# Patient Record
(demographics unavailable — no encounter records)

---

## 2024-11-07 NOTE — HISTORY OF PRESENT ILLNESS
[de-identified] : 67 years old female has past medical history of prediabetes, hyperlipidemia, overactive bladder, osteoporosis, vitamin D and B 12 deficiency, came back to review her most recent test results. Fasting glucose was 94, HBA1c 5.8, total cholesterol 188, , TSH 4.22, .8. Reports were reviewed with pt in details. Other blood tests came back wnl.

## 2024-11-07 NOTE — HISTORY OF PRESENT ILLNESS
[de-identified] : 67 years old female has past medical history of prediabetes, hyperlipidemia, overactive bladder, osteoporosis, vitamin D and B 12 deficiency, came back to review her most recent test results. Fasting glucose was 94, HBA1c 5.8, total cholesterol 188, , TSH 4.22, .8. Reports were reviewed with pt in details. Other blood tests came back wnl.

## 2025-02-19 NOTE — HISTORY OF PRESENT ILLNESS
[de-identified] : 67 years old female has past medical history of prediabetes, hyperlipidemia, overactive bladder, osteoporosis, vitamin D and B 12 deficiency, came back to review her most recent test results. Fasting glucose was 90, HBA1c 5.8, total cholesterol 173, . Reports were reviewed with pt in details. Other blood tests came back wnl.

## 2025-02-19 NOTE — HISTORY OF PRESENT ILLNESS
[de-identified] : 67 years old female has past medical history of prediabetes, hyperlipidemia, overactive bladder, osteoporosis, vitamin D and B 12 deficiency, came back to review her most recent test results. Fasting glucose was 90, HBA1c 5.8, total cholesterol 173, . Reports were reviewed with pt in details. Other blood tests came back wnl.

## 2025-02-19 NOTE — REVIEW OF SYSTEMS
[Negative] : Gastrointestinal [Joint Pain] : no joint pain [Joint Stiffness] : no joint stiffness [Joint Swelling] : no joint swelling [Muscle Pain] : muscle pain [Back Pain] : no back pain

## 2025-05-21 NOTE — END OF VISIT
[FreeTextEntry3] : Pt brought vaccine record to office. Update all immunizations done in pharmacy. Pt was advised to get Tdap vaccine in her pharmacy too.

## 2025-05-21 NOTE — HEALTH RISK ASSESSMENT
[Patient reported mammogram was normal] : Patient reported mammogram was normal [Patient reported colonoscopy was normal] : Patient reported colonoscopy was normal [Good] : ~his/her~  mood as  good [One fall no injury in past year] : Patient reported one fall in the past year without injury [0] : 2) Feeling down, depressed, or hopeless: Not at all (0) [PHQ-2 Negative - No further assessment needed] : PHQ-2 Negative - No further assessment needed [No] : does not take [Never] : Never [With Family] : lives with family [] :  [Fully functional (bathing, dressing, toileting, transferring, walking, feeding)] : Fully functional (bathing, dressing, toileting, transferring, walking, feeding) [Fully functional (using the telephone, shopping, preparing meals, housekeeping, doing laundry, using] : Fully functional and needs no help or supervision to perform IADLs (using the telephone, shopping, preparing meals, housekeeping, doing laundry, using transportation, managing medications and managing finances) [Designated Healthcare Proxy] : Designated healthcare proxy [Relationship: ___] : Relationship: [unfilled] [de-identified] : in early January 2025, no fracuture [[id number]] : 0 [MammogramDate] : 5/25 [BoneDensityDate] : 5/24 [BoneDensityComments] : osteoporosis [ColonoscopyDate] : 2023

## 2025-05-21 NOTE — HISTORY OF PRESENT ILLNESS
[de-identified] : 67 years old female has past medical history of prediabetes, hyperlipidemia, overactive bladder, osteoporosis, vitamin D and B 12 deficiency, came back for annual physical exam. Pt had blood and urine tests recently.  Fasting glucose was 90, HBA1c 6.1, total cholesterol 191, , TSH 4.2 and UA positive for trace leukocyte esterase.. Reports were reviewed with pt in details. Other blood tests came back wnl. Pt complained 3 days h/o headache, located at back of head, on and off, sharp lasting seconds to minutes, no pain today. Pt had similar problem before, seen by neurologist, MRI of brain unremarkable as per pt.

## 2025-05-21 NOTE — HISTORY OF PRESENT ILLNESS
[de-identified] : 67 years old female has past medical history of prediabetes, hyperlipidemia, overactive bladder, osteoporosis, vitamin D and B 12 deficiency, came back for annual physical exam. Pt had blood and urine tests recently.  Fasting glucose was 90, HBA1c 6.1, total cholesterol 191, , TSH 4.2 and UA positive for trace leukocyte esterase.. Reports were reviewed with pt in details. Other blood tests came back wnl. Pt complained 3 days h/o headache, located at back of head, on and off, sharp lasting seconds to minutes, no pain today. Pt had similar problem before, seen by neurologist, MRI of brain unremarkable as per pt.

## 2025-05-21 NOTE — HEALTH RISK ASSESSMENT
[Patient reported mammogram was normal] : Patient reported mammogram was normal [Patient reported colonoscopy was normal] : Patient reported colonoscopy was normal [Good] : ~his/her~  mood as  good [One fall no injury in past year] : Patient reported one fall in the past year without injury [0] : 2) Feeling down, depressed, or hopeless: Not at all (0) [PHQ-2 Negative - No further assessment needed] : PHQ-2 Negative - No further assessment needed [No] : does not take [Never] : Never [With Family] : lives with family [] :  [Fully functional (bathing, dressing, toileting, transferring, walking, feeding)] : Fully functional (bathing, dressing, toileting, transferring, walking, feeding) [Fully functional (using the telephone, shopping, preparing meals, housekeeping, doing laundry, using] : Fully functional and needs no help or supervision to perform IADLs (using the telephone, shopping, preparing meals, housekeeping, doing laundry, using transportation, managing medications and managing finances) [Designated Healthcare Proxy] : Designated healthcare proxy [Relationship: ___] : Relationship: [unfilled] [de-identified] : in early January 2025, no fracuture [[id number]] : 0 [MammogramDate] : 5/25 [BoneDensityDate] : 5/24 [BoneDensityComments] : osteoporosis [ColonoscopyDate] : 2023